# Patient Record
Sex: MALE | Race: WHITE | NOT HISPANIC OR LATINO | Employment: FULL TIME | URBAN - METROPOLITAN AREA
[De-identification: names, ages, dates, MRNs, and addresses within clinical notes are randomized per-mention and may not be internally consistent; named-entity substitution may affect disease eponyms.]

---

## 2021-08-30 ENCOUNTER — TRANSFERRED RECORDS (OUTPATIENT)
Dept: HEALTH INFORMATION MANAGEMENT | Facility: CLINIC | Age: 41
End: 2021-08-30

## 2021-09-08 ENCOUNTER — VIRTUAL VISIT (OUTPATIENT)
Dept: UROLOGY | Facility: CLINIC | Age: 41
End: 2021-09-08
Payer: COMMERCIAL

## 2021-09-08 VITALS — BODY MASS INDEX: 34.3 KG/M2 | HEIGHT: 71 IN | WEIGHT: 245 LBS

## 2021-09-08 DIAGNOSIS — R10.32 LLQ ABDOMINAL PAIN: ICD-10-CM

## 2021-09-08 DIAGNOSIS — N13.30 HYDRONEPHROSIS, UNSPECIFIED HYDRONEPHROSIS TYPE: Primary | ICD-10-CM

## 2021-09-08 PROCEDURE — 99203 OFFICE O/P NEW LOW 30 MIN: CPT | Mod: GT | Performed by: PHYSICIAN ASSISTANT

## 2021-09-08 ASSESSMENT — ENCOUNTER SYMPTOMS
FLANK PAIN: 0
VOMITING: 0
SHORTNESS OF BREATH: 0
CHILLS: 0
ABDOMINAL PAIN: 1
HEMATURIA: 0
NAUSEA: 1
DYSURIA: 0
FEVER: 0
FREQUENCY: 0

## 2021-09-08 ASSESSMENT — PAIN SCALES - GENERAL: PAINLEVEL: NO PAIN (1)

## 2021-09-08 ASSESSMENT — MIFFLIN-ST. JEOR: SCORE: 2043.44

## 2021-09-08 NOTE — PROGRESS NOTES
*SEND LINK TO CELL PHONE*    PT HAD  A STONE 15 YEARS AGO  LLQ ABD PAIN    Peter is a 41 year old who is being evaluated via a billable video visit.      How would you like to obtain your AVS? MyChart  If the video visit is dropped, the invitation should be resent by: Text to cell phone: [phone]  Will anyone else be joining your video visit? No      Video-Visit Details    Type of service:  Video Visit    Video Start Time: 1503    Video End Time:1528     Originating Location (pt. Location): Other Work    Distant Location (provider location):  [facility] [place]     Platform used for Video Visit: MaryConisus     Priscilla      REQUESTING PROVIDER   Kassie Hogue     REASON FOR CONSULT   Hydronephrosis/prominent extra renal pelvis    HISTORY OF PRESENT ILLNESS   Mr. Osuna is a very pleasant 31-year-old gentleman, who presents today for evaluation recommendations regarding recently found questionable left hydronephrosis.  He underwent CT imaging for left lower quadrant pain.  Found on this was left-sided hydronephrosis versus prominent extrarenal pelvis.  There was concern for possible mild UPJ.  Right renal pelvis is slightly more pronounced as well.    Patient continues to have left lower quadrant pain greater than 2 weeks.  It was worse when it was being examined and he was having pushing.  He also endorsed more discomfort when eating.  Patient denies any flank pain or discomfort with increased alcohol or caffeine intake.    Patient notes his most recent creatinine on 08/30/2021 was 0.91 with a EGFR of 105.  He denies any history of urinary tract infections or pyelonephritis.  He denies hematuria, dysuria, urgency, frequency, or nocturia.  He endorsed some nausea at the beginning of this pain, but it has resolved.    He does note that he had a kidney stone approximately 15 to 20 years ago.  He thinks that he passed it.    The following portions of the patient's history were reviewed  and updated as appropriate: allergies, current medications, past family history, past medical history, past social history, past surgical history and problem list.     REVIEW OF SYSTEMS   Review of Systems   Constitutional: Negative for chills and fever.   Respiratory: Negative for shortness of breath.    Cardiovascular: Negative for chest pain.   Gastrointestinal: Positive for abdominal pain and nausea (Resolved). Negative for vomiting.   Genitourinary: Negative for dysuria, flank pain, frequency, hematuria and urgency.      Per HPI.     There is no problem list on file for this patient.     History reviewed. No pertinent past medical history.   History reviewed. No pertinent surgical history.     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  .    Family History:   No family history of nephrolithiasis or  malignancy.  Patient notes his mother has had diverticulosis and diverticulitis.    Objective      PHYSICAL EXAM   GENERAL: Healthy, alert and no distress  EYES: Eyes grossly normal to inspection.  No discharge or erythema, or obvious scleral/conjunctival abnormalities.  HENT: Normal cephalic/atraumatic.  External ears, nose and mouth without ulcers or lesions.  No nasal drainage visible.  NECK: No asymmetry, visible masses or scars  RESP: No audible wheeze, cough, or visible cyanosis.  No visible retractions or increased work of breathing.    MS: No gross musculoskeletal defects noted.  Normal range of motion.  No visible edema.  SKIN: Visible skin clear. No significant rash, abnormal pigmentation or lesions.  NEURO: Cranial nerves grossly intact.  Mentation and speech appropriate for age.  PSYCH: Mentation appears normal, affect normal/bright, judgement and insight intact, normal speech and appearance well-groomed.     LABORATORY   Creatinine 0.91 with EGFR of 105.    IMAGING     I personally reviewed the images.     CT of the abdomen pelvis 08/30/2021.    No acute findings in the  abdomen.  Mild dilatation of the left renal pelvis.  Could be prominent extrarenal pelvis or mild UPJ obstruction.  No nephrolithiasis.    Assessment & Plan    1. Hydronephrosis, unspecified hydronephrosis type    2. LLQ abdominal pain       I had the pleasure today meeting with Mr. Osuna to discuss the findings on his recent CT imaging.  Based upon his symptomatology that prompted the imaging, I do not know that the findings in the urinary system are related to this.    We briefly discussed the findings of a prominent extrarenal pelvis versus UPJ obstruction.  We discussed that ureteropelvic junction obstruction is typically congenital.  Patient sounds as though he is asymptomatic if it is present.    -Recommendation would be for a MAG3 Lasix renogram to determine split renal function and to determine if there is evidence of obstruction.  Would have follow-up visit after this to discuss.    -Discussed that if there is evidence of UPJ obstruction, treatment would be robotic pyeloplasty with indwelling ureteral stent in place for 6 weeks.  We discussed the high success rate of this procedure.    -If no evidence of obstruction, this would be more of a cosmetic issue and no further work-up or follow-up is warranted.    -If indeterminate for obstruction, would likely have continued monitoring of creatinine, MAG3 Lasix renogram, and symptomatology.    Briefly discussed that T 1/2 of less than 10 minutes shows no obstruction.  10 minutes to 20 minutes is indeterminant, and greater than 20 minutes is considered obstruction.    Did discuss with him that it may be more consistent with prominent extrarenal pelvis.  However we will complete the testing.  Signed by:     Kassie Hogue PA-C 9/8/2021

## 2021-09-08 NOTE — LETTER
9/8/2021       RE: Bradley Osuna  [address]  [place] [facility] [address]     Dear Colleague,    Thank you for referring your patient, Bradley Osuna, to the [facility] [place] at [facility]. Please see a copy of my visit note below.    *SEND LINK TO CELL PHONE*    PT HAD  A STONE 15 YEARS AGO  LLQ ABD PAIN    Peter is a 41 year old who is being evaluated via a billable video visit.      How would you like to obtain your AVS? MyChart  If the video visit is dropped, the invitation should be resent by: Text to cell phone: [phone]  Will anyone else be joining your video visit? No      Video-Visit Details    Type of service:  Video Visit    Video Start Time: 1503    Video End Time:1528     Originating Location (pt. Location): Other Work    Distant Location (provider location):  [facility] [place]     Platform used for Video Visit: Aria Wells      REQUESTING PROVIDER   Kassie Hogue     REASON FOR CONSULT   Hydronephrosis/prominent extra renal pelvis    HISTORY OF PRESENT ILLNESS   Mr. Osuna is a very pleasant 31-year-old gentleman, who presents today for evaluation recommendations regarding recently found questionable left hydronephrosis.  He underwent CT imaging for left lower quadrant pain.  Found on this was left-sided hydronephrosis versus prominent extrarenal pelvis.  There was concern for possible mild UPJ.  Right renal pelvis is slightly more pronounced as well.    Patient continues to have left lower quadrant pain greater than 2 weeks.  It was worse when it was being examined and he was having pushing.  He also endorsed more discomfort when eating.  Patient denies any flank pain or discomfort with increased alcohol or caffeine intake.    Patient notes his most recent creatinine on 08/30/2021 was 0.91 with a EGFR of 105.  He denies any history of urinary tract infections or pyelonephritis.   He denies hematuria, dysuria, urgency, frequency, or nocturia.  He endorsed some nausea at the beginning of this pain, but it has resolved.    He does note that he had a kidney stone approximately 15 to 20 years ago.  He thinks that he passed it.    The following portions of the patient's history were reviewed and updated as appropriate: allergies, current medications, past family history, past medical history, past social history, past surgical history and problem list.     REVIEW OF SYSTEMS   Review of Systems   Constitutional: Negative for chills and fever.   Respiratory: Negative for shortness of breath.    Cardiovascular: Negative for chest pain.   Gastrointestinal: Positive for abdominal pain and nausea (Resolved). Negative for vomiting.   Genitourinary: Negative for dysuria, flank pain, frequency, hematuria and urgency.      Per HPI.     There is no problem list on file for this patient.     History reviewed. No pertinent past medical history.   History reviewed. No pertinent surgical history.     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  .    Family History:   No family history of nephrolithiasis or  malignancy.  Patient notes his mother has had diverticulosis and diverticulitis.    Objective      PHYSICAL EXAM   GENERAL: Healthy, alert and no distress  EYES: Eyes grossly normal to inspection.  No discharge or erythema, or obvious scleral/conjunctival abnormalities.  HENT: Normal cephalic/atraumatic.  External ears, nose and mouth without ulcers or lesions.  No nasal drainage visible.  NECK: No asymmetry, visible masses or scars  RESP: No audible wheeze, cough, or visible cyanosis.  No visible retractions or increased work of breathing.    MS: No gross musculoskeletal defects noted.  Normal range of motion.  No visible edema.  SKIN: Visible skin clear. No significant rash, abnormal pigmentation or lesions.  NEURO: Cranial nerves grossly intact.  Mentation and speech appropriate  for age.  PSYCH: Mentation appears normal, affect normal/bright, judgement and insight intact, normal speech and appearance well-groomed.     LABORATORY   Creatinine 0.91 with EGFR of 105.    IMAGING     I personally reviewed the images.     CT of the abdomen pelvis 08/30/2021.    No acute findings in the abdomen.  Mild dilatation of the left renal pelvis.  Could be prominent extrarenal pelvis or mild UPJ obstruction.  No nephrolithiasis.    Assessment & Plan    1. Hydronephrosis, unspecified hydronephrosis type    2. LLQ abdominal pain       I had the pleasure today meeting with Mr. Osuna to discuss the findings on his recent CT imaging.  Based upon his symptomatology that prompted the imaging, I do not know that the findings in the urinary system are related to this.    We briefly discussed the findings of a prominent extrarenal pelvis versus UPJ obstruction.  We discussed that ureteropelvic junction obstruction is typically congenital.  Patient sounds as though he is asymptomatic if it is present.    -Recommendation would be for a MAG3 Lasix renogram to determine split renal function and to determine if there is evidence of obstruction.  Would have follow-up visit after this to discuss.    -Discussed that if there is evidence of UPJ obstruction, treatment would be robotic pyeloplasty with indwelling ureteral stent in place for 6 weeks.  We discussed the high success rate of this procedure.    -If no evidence of obstruction, this would be more of a cosmetic issue and no further work-up or follow-up is warranted.    -If indeterminate for obstruction, would likely have continued monitoring of creatinine, MAG3 Lasix renogram, and symptomatology.    Briefly discussed that T 1/2 of less than 10 minutes shows no obstruction.  10 minutes to 20 minutes is indeterminant, and greater than 20 minutes is considered obstruction.    Did discuss with him that it may be more consistent with prominent extrarenal pelvis.  However  we will complete the testing.  Signed by:     Kassie Hogue PA-C 9/8/2021

## 2021-09-08 NOTE — PATIENT INSTRUCTIONS
-Recommendation would be for a MAG3 Lasix renogram to determine split renal function and to determine if there is evidence of obstruction.  Would have follow-up visit after this to discuss.    -Discussed that if there is evidence of UPJ obstruction, treatment would be robotic pyeloplasty with indwelling ureteral stent in place for 6 weeks.     -If no evidence of obstruction, this would be more of a cosmetic issue and no further work-up or follow-up is warranted.    -If indeterminate for obstruction, would likely have continued monitoring of creatinine, MAG3 Lasix renogram, and symptomatology.

## 2021-09-13 ENCOUNTER — HOSPITAL ENCOUNTER (OUTPATIENT)
Dept: NUCLEAR MEDICINE | Facility: CLINIC | Age: 41
Setting detail: NUCLEAR MEDICINE
Discharge: HOME | End: 2021-09-13
Attending: PHYSICIAN ASSISTANT | Admitting: PHYSICIAN ASSISTANT
Payer: COMMERCIAL

## 2021-09-13 DIAGNOSIS — N13.30 HYDRONEPHROSIS, UNSPECIFIED HYDRONEPHROSIS TYPE: ICD-10-CM

## 2021-09-13 PROCEDURE — 78708 K FLOW/FUNCT IMAGE W/DRUG: CPT

## 2021-09-13 PROCEDURE — 343N000001 HC RX 343: Performed by: PHYSICIAN ASSISTANT

## 2021-09-13 PROCEDURE — 250N000011 HC RX IP 250 OP 636

## 2021-09-13 PROCEDURE — A9562 TC99M MERTIATIDE: HCPCS | Performed by: PHYSICIAN ASSISTANT

## 2021-09-13 RX ORDER — FUROSEMIDE 10 MG/ML
INJECTION INTRAMUSCULAR; INTRAVENOUS
Status: COMPLETED
Start: 2021-09-13 | End: 2021-09-13

## 2021-09-13 RX ADMIN — FUROSEMIDE 20 MG: 10 INJECTION, SOLUTION INTRAMUSCULAR; INTRAVENOUS at 09:55

## 2021-09-13 RX ADMIN — TECHNESCAN TC 99M MERTIATIDE 10 MCI.: 1 INJECTION, POWDER, LYOPHILIZED, FOR SOLUTION INTRAVENOUS at 09:36

## 2021-09-23 ENCOUNTER — VIRTUAL VISIT (OUTPATIENT)
Dept: UROLOGY | Facility: CLINIC | Age: 41
End: 2021-09-23
Payer: COMMERCIAL

## 2021-09-23 VITALS — BODY MASS INDEX: 34.3 KG/M2 | WEIGHT: 245 LBS | HEIGHT: 71 IN

## 2021-09-23 DIAGNOSIS — N13.30 HYDRONEPHROSIS, UNSPECIFIED HYDRONEPHROSIS TYPE: ICD-10-CM

## 2021-09-23 DIAGNOSIS — N28.89 DILATED RENAL PELVIS: Primary | ICD-10-CM

## 2021-09-23 PROCEDURE — 99213 OFFICE O/P EST LOW 20 MIN: CPT | Mod: GT | Performed by: PHYSICIAN ASSISTANT

## 2021-09-23 ASSESSMENT — MIFFLIN-ST. JEOR: SCORE: 2043.44

## 2021-09-23 ASSESSMENT — ENCOUNTER SYMPTOMS
FLANK PAIN: 0
ABDOMINAL PAIN: 1

## 2021-09-23 ASSESSMENT — PAIN SCALES - GENERAL: PAINLEVEL: NO PAIN (0)

## 2021-09-23 NOTE — LETTER
9/23/2021       RE: Bradley Osuna  [address] [facility] [address]     Dear Colleague,    Thank you for referring your patient, Bradley Osuna, to the [facility] [place] at [facility]. Please see a copy of my visit note below.    Subjective      CHIEF COMPLAINT/REASON FOR VISIT   Follow up on testing      HISTORY OF PRESENT ILLNESS   Mr. Osuna is a very pleasant 41-year-old gentleman, who presents today for follow-up discussion regarding his recent nuclear medicine scan.  I initially saw him on 09/08/2021 for concern of hydronephrosis on the left.  He did have a kidney stone approximately 15 years ago.  The right renal pelvis was also slightly more pronounced.    Imaging was done for left lower quadrant pain that was reproducible on examination.  He did not have symptoms consistent with UPJ obstruction.    Patient denies pain during the nuclear renogram.  He denies any flank pain.    The following portions of the patient's history were reviewed and updated as appropriate: allergies, current medications, past family history, past medical history, past social history, past surgical history, and problem list.     REVIEW OF SYSTEMS   Review of Systems   Gastrointestinal: Positive for abdominal pain.   Genitourinary: Negative for flank pain.      Per HPI.     There is no problem list on file for this patient.     No past medical history on file.     Objective      PHYSICAL EXAM   GENERAL: Healthy, alert and no distress  EYES: Eyes grossly normal to inspection.  No discharge or erythema, or obvious scleral/conjunctival abnormalities.  HENT: Normal cephalic/atraumatic.  External ears, nose and mouth without ulcers or lesions.  No nasal drainage visible.  NECK: No asymmetry, visible masses or scars  RESP: No audible wheeze, cough, or visible cyanosis.  No visible retractions or increased work of breathing.    SKIN: Visible skin clear. No  significant rash, abnormal pigmentation or lesions.  NEURO: Cranial nerves grossly intact.  Mentation and speech appropriate for age.  PSYCH: Mentation appears normal, affect normal/bright, judgement and insight intact, normal speech and appearance well-groomed.    IMAGING     I personally reviewed the images.     NM Renogram with Lasix    Result Date: 9/13/2021  NUCLEAR MEDICINE RENOGRAM WITH LASIX   9/13/2021 11:02 AM TECHNIQUE:  The patient was given 10 mCi Tc 99m MAG3 IV. The patient was given 20 mg IV Lasix at 10 minutes into the examination. Dynamic posterior imaging over kidneys was performed. HISTORY:  Concern for left UPJ obstruction versus prominent extrarenal pelvis, obstruction. Split renal function. Hydronephrosis, unspecified hydronephrosis type. COMPARISON: Nuclear Study: None. Other Relevant Studies: None. FINDINGS:  Symmetric early perfusion images of the kidneys. There is tracer excretion noted bilaterally. No evidence for renal obstruction. The delayed images show prominence of the bilateral renal collecting systems. This is confirmed on the bilateral renogram curves. The split renal function calculates to 51.3% on the left, and 48.7% on the right.     IMPRESSION: 1. No evidence for complete renal obstruction on either side. 2. Prominent tracer uptake within the right and left proximal renal collecting system suggesting bilateral extrarenal pelvis and/or hydronephrosis. Bilateral UPJ partial obstruction is possible. 3. Symmetric split renal functions. MAINE AWAN MD   SYSTEM ID:  [id number]     Assessment & Plan    1. Dilated renal pelvis    2. Hydronephrosis, unspecified hydronephrosis type      I the pleasure today meeting with Mr. Osuna to discuss his recent imaging.  The MAG3 Lasix renogram shows prominent uptake within the left and right collecting system suggesting bilateral extrarenal pelvis or hydronephrosis.he were slightly hesitant and saying possible bilateral UPJ obstruction.  Patient  does not have symptoms consistent with UPJ.  Split renal function is well preserved with 51% on the left and 49% on the right. T 1/2 is indeterminate on the right and questionable on the left as well.  I discussed this case with Dr. Judge.    -Recommendation for a repeat nuclear renogram in 1 year with office visit to discuss to ensure no worsening of findings.    -Contact us in the interim if there are new findings in flank pain or change in overall kidney function.    Discussed that this is likely representative bilateral prominent extrarenal pelvis and less likely a bilateral UPJ obstruction.  However, we will continue to monitor.    Signed by:       Kassie Hogue PA-C 9/23/2021       Send invite for video visit to Text to cell phone: [phone].    KAYA Atkinsy is a 41 year old who is being evaluated via a billable video visit.      How would you like to obtain your AVS? Mail a copy  If the video visit is dropped, the invitation should be resent by: Text to cell phone: [phone]  Will anyone else be joining your video visit? No      Video Start Time: 1339  Video-Visit Details    Type of service:  Video Visit    Video End Time:1346    Originating Location (pt. Location): Home    Distant Location (provider location):  [facility] [place]     Platform used for Video Visit: VCE

## 2021-09-23 NOTE — PROGRESS NOTES
Subjective      CHIEF COMPLAINT/REASON FOR VISIT   Follow up on testing      HISTORY OF PRESENT ILLNESS   Mr. Osuna is a very pleasant 41-year-old gentleman, who presents today for follow-up discussion regarding his recent nuclear medicine scan.  I initially saw him on 09/08/2021 for concern of hydronephrosis on the left.  He did have a kidney stone approximately 15 years ago.  The right renal pelvis was also slightly more pronounced.    Imaging was done for left lower quadrant pain that was reproducible on examination.  He did not have symptoms consistent with UPJ obstruction.    Patient denies pain during the nuclear renogram.  He denies any flank pain.    The following portions of the patient's history were reviewed and updated as appropriate: allergies, current medications, past family history, past medical history, past social history, past surgical history, and problem list.     REVIEW OF SYSTEMS   Review of Systems   Gastrointestinal: Positive for abdominal pain.   Genitourinary: Negative for flank pain.      Per HPI.     There is no problem list on file for this patient.     No past medical history on file.     Objective      PHYSICAL EXAM   GENERAL: Healthy, alert and no distress  EYES: Eyes grossly normal to inspection.  No discharge or erythema, or obvious scleral/conjunctival abnormalities.  HENT: Normal cephalic/atraumatic.  External ears, nose and mouth without ulcers or lesions.  No nasal drainage visible.  NECK: No asymmetry, visible masses or scars  RESP: No audible wheeze, cough, or visible cyanosis.  No visible retractions or increased work of breathing.    SKIN: Visible skin clear. No significant rash, abnormal pigmentation or lesions.  NEURO: Cranial nerves grossly intact.  Mentation and speech appropriate for age.  PSYCH: Mentation appears normal, affect normal/bright, judgement and insight intact, normal speech and appearance well-groomed.    IMAGING     I personally reviewed the images.      NM Renogram with Lasix    Result Date: 9/13/2021  NUCLEAR MEDICINE RENOGRAM WITH LASIX   9/13/2021 11:02 AM TECHNIQUE:  The patient was given 10 mCi Tc 99m MAG3 IV. The patient was given 20 mg IV Lasix at 10 minutes into the examination. Dynamic posterior imaging over kidneys was performed. HISTORY:  Concern for left UPJ obstruction versus prominent extrarenal pelvis, obstruction. Split renal function. Hydronephrosis, unspecified hydronephrosis type. COMPARISON: Nuclear Study: None. Other Relevant Studies: None. FINDINGS:  Symmetric early perfusion images of the kidneys. There is tracer excretion noted bilaterally. No evidence for renal obstruction. The delayed images show prominence of the bilateral renal collecting systems. This is confirmed on the bilateral renogram curves. The split renal function calculates to 51.3% on the left, and 48.7% on the right.     IMPRESSION: 1. No evidence for complete renal obstruction on either side. 2. Prominent tracer uptake within the right and left proximal renal collecting system suggesting bilateral extrarenal pelvis and/or hydronephrosis. Bilateral UPJ partial obstruction is possible. 3. Symmetric split renal functions. MAINE AWAN MD   SYSTEM ID:  [id number]     Assessment & Plan    1. Dilated renal pelvis    2. Hydronephrosis, unspecified hydronephrosis type      I the pleasure today meeting with Mr. Osuna to discuss his recent imaging.  The MAG3 Lasix renogram shows prominent uptake within the left and right collecting system suggesting bilateral extrarenal pelvis or hydronephrosis.he were slightly hesitant and saying possible bilateral UPJ obstruction.  Patient does not have symptoms consistent with UPJ.  Split renal function is well preserved with 51% on the left and 49% on the right. T 1/2 is indeterminate on the right and questionable on the left as well.  I discussed this case with Dr. Judge.    -Recommendation for a repeat nuclear renogram in 1 year with office  visit to discuss to ensure no worsening of findings.    -Contact us in the interim if there are new findings in flank pain or change in overall kidney function.    Discussed that this is likely representative bilateral prominent extrarenal pelvis and less likely a bilateral UPJ obstruction.  However, we will continue to monitor.    Signed by:       Kassie Hogue PA-C 9/23/2021

## 2021-09-23 NOTE — PATIENT INSTRUCTIONS
-Recommendation for a repeat nuclear renogram in 1 year with office visit to discuss to ensure no worsening of findings.    -Contact us in the interim if there are new findings in flank pain or change in overall kidney function.

## 2021-09-23 NOTE — PROGRESS NOTES
Send invite for video visit to Text to cell phone: [phone].    Deepa Duval LPN    Peter is a 41 year old who is being evaluated via a billable video visit.      How would you like to obtain your AVS? Mail a copy  If the video visit is dropped, the invitation should be resent by: Text to cell phone: [phone]  Will anyone else be joining your video visit? No      Video Start Time: 1339  Video-Visit Details    Type of service:  Video Visit    Video End Time:1346    Originating Location (pt. Location): Home    Distant Location (provider location):  [facility] [place]     Platform used for Video Visit: Auditude

## 2021-09-23 NOTE — NURSING NOTE
Chief Complaint   Patient presents with     Hydronephrosis     Go over Lasix Renogram      Deepa Duval LPN